# Patient Record
Sex: MALE | Race: WHITE
[De-identification: names, ages, dates, MRNs, and addresses within clinical notes are randomized per-mention and may not be internally consistent; named-entity substitution may affect disease eponyms.]

---

## 2019-03-09 ENCOUNTER — HOSPITAL ENCOUNTER (INPATIENT)
Dept: HOSPITAL 12 - ER | Age: 74
LOS: 3 days | Discharge: HOME | DRG: 871 | End: 2019-03-12
Payer: MEDICARE

## 2019-03-09 VITALS — WEIGHT: 289 LBS | HEIGHT: 68 IN | BODY MASS INDEX: 43.8 KG/M2

## 2019-03-09 DIAGNOSIS — L03.115: ICD-10-CM

## 2019-03-09 DIAGNOSIS — E11.51: ICD-10-CM

## 2019-03-09 DIAGNOSIS — Z91.81: ICD-10-CM

## 2019-03-09 DIAGNOSIS — N40.1: ICD-10-CM

## 2019-03-09 DIAGNOSIS — E66.01: ICD-10-CM

## 2019-03-09 DIAGNOSIS — R33.8: ICD-10-CM

## 2019-03-09 DIAGNOSIS — E78.5: ICD-10-CM

## 2019-03-09 DIAGNOSIS — B96.89: ICD-10-CM

## 2019-03-09 DIAGNOSIS — R65.20: ICD-10-CM

## 2019-03-09 DIAGNOSIS — I13.0: ICD-10-CM

## 2019-03-09 DIAGNOSIS — I25.10: ICD-10-CM

## 2019-03-09 DIAGNOSIS — E11.42: ICD-10-CM

## 2019-03-09 DIAGNOSIS — E87.1: ICD-10-CM

## 2019-03-09 DIAGNOSIS — E11.22: ICD-10-CM

## 2019-03-09 DIAGNOSIS — D63.8: ICD-10-CM

## 2019-03-09 DIAGNOSIS — Z71.3: ICD-10-CM

## 2019-03-09 DIAGNOSIS — A41.59: Primary | ICD-10-CM

## 2019-03-09 DIAGNOSIS — N18.9: ICD-10-CM

## 2019-03-09 DIAGNOSIS — I50.9: ICD-10-CM

## 2019-03-09 DIAGNOSIS — M89.9: ICD-10-CM

## 2019-03-09 DIAGNOSIS — Z16.11: ICD-10-CM

## 2019-03-09 DIAGNOSIS — E44.0: ICD-10-CM

## 2019-03-09 DIAGNOSIS — Z79.4: ICD-10-CM

## 2019-03-09 DIAGNOSIS — L03.116: ICD-10-CM

## 2019-03-09 DIAGNOSIS — I87.8: ICD-10-CM

## 2019-03-09 DIAGNOSIS — I45.10: ICD-10-CM

## 2019-03-09 DIAGNOSIS — N39.0: ICD-10-CM

## 2019-03-09 DIAGNOSIS — E11.65: ICD-10-CM

## 2019-03-09 DIAGNOSIS — N17.0: ICD-10-CM

## 2019-03-09 PROCEDURE — A4663 DIALYSIS BLOOD PRESSURE CUFF: HCPCS

## 2019-03-09 PROCEDURE — G0378 HOSPITAL OBSERVATION PER HR: HCPCS

## 2019-03-10 VITALS — SYSTOLIC BLOOD PRESSURE: 128 MMHG | DIASTOLIC BLOOD PRESSURE: 63 MMHG

## 2019-03-10 VITALS — SYSTOLIC BLOOD PRESSURE: 122 MMHG | DIASTOLIC BLOOD PRESSURE: 61 MMHG

## 2019-03-10 VITALS — DIASTOLIC BLOOD PRESSURE: 54 MMHG | SYSTOLIC BLOOD PRESSURE: 135 MMHG

## 2019-03-10 VITALS — DIASTOLIC BLOOD PRESSURE: 68 MMHG | SYSTOLIC BLOOD PRESSURE: 145 MMHG

## 2019-03-10 LAB
ALP SERPL-CCNC: 43 U/L (ref 50–136)
ALT SERPL W/O P-5'-P-CCNC: 15 U/L (ref 16–63)
APPEARANCE UR: (no result)
APPEARANCE UR: (no result)
AST SERPL-CCNC: 13 U/L (ref 15–37)
BASOPHILS # BLD AUTO: 0.1 K/UL (ref 0–8)
BASOPHILS # BLD AUTO: 0.2 K/UL (ref 0–8)
BASOPHILS NFR BLD AUTO: 0.5 % (ref 0–2)
BASOPHILS NFR BLD AUTO: 0.9 % (ref 0–2)
BILIRUB DIRECT SERPL-MCNC: 0.3 MG/DL (ref 0–0.2)
BILIRUB SERPL-MCNC: 1 MG/DL (ref 0.2–1)
BILIRUB UR QL STRIP: NEGATIVE
BILIRUB UR QL STRIP: NEGATIVE
BUN SERPL-MCNC: 32 MG/DL (ref 7–18)
BUN SERPL-MCNC: 32 MG/DL (ref 7–18)
CHLORIDE SERPL-SCNC: 100 MMOL/L (ref 98–107)
CHLORIDE SERPL-SCNC: 98 MMOL/L (ref 98–107)
CHOLEST SERPL-MCNC: 87 MG/DL (ref ?–200)
CO2 SERPL-SCNC: 25 MMOL/L (ref 21–32)
CO2 SERPL-SCNC: 26 MMOL/L (ref 21–32)
COLOR UR: (no result)
COLOR UR: YELLOW
CREAT SERPL-MCNC: 2.1 MG/DL (ref 0.6–1.3)
CREAT SERPL-MCNC: 2.3 MG/DL (ref 0.6–1.3)
CREAT UR-MCNC: 82.7 MG/DL (ref 30–125)
DEPRECATED SQUAMOUS URNS QL MICRO: (no result) /HPF
DEPRECATED SQUAMOUS URNS QL MICRO: (no result) /HPF
EOSINOPHIL # BLD AUTO: 0 K/UL (ref 0–0.7)
EOSINOPHIL # BLD AUTO: 0 K/UL (ref 0–0.7)
EOSINOPHIL NFR BLD AUTO: 0.2 % (ref 0–7)
EOSINOPHIL NFR BLD AUTO: 0.3 % (ref 0–7)
GLUCOSE SERPL-MCNC: 111 MG/DL (ref 74–106)
GLUCOSE SERPL-MCNC: 118 MG/DL (ref 74–106)
GLUCOSE UR STRIP-MCNC: (no result) MG/DL
GLUCOSE UR STRIP-MCNC: NEGATIVE MG/DL
HCT VFR BLD AUTO: 35 % (ref 36.7–47.1)
HCT VFR BLD AUTO: 36.4 % (ref 36.7–47.1)
HDLC SERPL-MCNC: 32 MG/DL (ref 40–60)
HGB BLD-MCNC: 11.9 G/DL (ref 12.5–16.3)
HGB BLD-MCNC: 12.1 G/DL (ref 12.5–16.3)
HGB UR QL STRIP: (no result)
HGB UR QL STRIP: (no result)
KETONES UR STRIP-MCNC: NEGATIVE MG/DL
KETONES UR STRIP-MCNC: NEGATIVE MG/DL
LEUKOCYTE ESTERASE UR QL STRIP: (no result)
LEUKOCYTE ESTERASE UR QL STRIP: (no result)
LYMPHOCYTES # BLD AUTO: 1.3 K/UL (ref 20–40)
LYMPHOCYTES # BLD AUTO: 1.5 K/UL (ref 20–40)
LYMPHOCYTES NFR BLD AUTO: 6.9 % (ref 20.5–51.5)
LYMPHOCYTES NFR BLD AUTO: 8.7 % (ref 20.5–51.5)
LYMPHOCYTES NFR BLD MANUAL: 3 % (ref 20–40)
MAGNESIUM SERPL-MCNC: 2.2 MG/DL (ref 1.8–2.4)
MCH RBC QN AUTO: 27.9 UUG (ref 23.8–33.4)
MCH RBC QN AUTO: 28.5 UUG (ref 23.8–33.4)
MCHC RBC AUTO-ENTMCNC: 33 G/DL (ref 32.5–36.3)
MCHC RBC AUTO-ENTMCNC: 34 G/DL (ref 32.5–36.3)
MCV RBC AUTO: 83.7 FL (ref 73–96.2)
MCV RBC AUTO: 84 FL (ref 73–96.2)
MONOCYTES # BLD AUTO: 1.8 K/UL (ref 2–10)
MONOCYTES # BLD AUTO: 2 K/UL (ref 2–10)
MONOCYTES NFR BLD AUTO: 11.7 % (ref 0–11)
MONOCYTES NFR BLD AUTO: 9.8 % (ref 0–11)
MONOCYTES NFR BLD MANUAL: 9 % (ref 2–10)
MUCOUS THREADS URNS QL MICRO: (no result) /LPF
NEUTROPHILS # BLD AUTO: 13.5 K/UL (ref 1.8–8.9)
NEUTROPHILS # BLD AUTO: 15.5 K/UL (ref 1.8–8.9)
NEUTROPHILS NFR BLD AUTO: 78.8 % (ref 38.5–71.5)
NEUTROPHILS NFR BLD AUTO: 82.2 % (ref 38.5–71.5)
NEUTS SEG NFR BLD MANUAL: 85 % (ref 42–75)
NITRITE UR QL STRIP: NEGATIVE
NITRITE UR QL STRIP: NEGATIVE
PH UR STRIP: 5.5 [PH] (ref 5–8)
PH UR STRIP: 6 [PH] (ref 5–8)
PHOSPHATE SERPL-MCNC: 3.1 MG/DL (ref 2.5–4.9)
PLATELET # BLD AUTO: 207 K/UL (ref 152–348)
PLATELET # BLD AUTO: 217 K/UL (ref 152–348)
POTASSIUM SERPL-SCNC: 4 MMOL/L (ref 3.5–5.1)
POTASSIUM SERPL-SCNC: 4.5 MMOL/L (ref 3.5–5.1)
PROT UR-MCNC: 97.9 MG/DL
RBC # BLD AUTO: 4.16 MIL/UL (ref 4.06–5.63)
RBC # BLD AUTO: 4.35 MIL/UL (ref 4.06–5.63)
RBC #/AREA URNS HPF: (no result) /HPF (ref 0–3)
RBC #/AREA URNS HPF: (no result) /HPF (ref 0–3)
SP GR UR STRIP: 1.02 (ref 1–1.03)
SP GR UR STRIP: 1.02 (ref 1–1.03)
TRIGL SERPL-MCNC: 62 MG/DL (ref 30–150)
UROBILINOGEN UR STRIP-MCNC: 0.2 E.U./DL
UROBILINOGEN UR STRIP-MCNC: 0.2 E.U./DL
WBC # BLD AUTO: 17.1 K/UL (ref 3.6–10.2)
WBC # BLD AUTO: 18.9 K/UL (ref 3.6–10.2)
WBC #/AREA URNS HPF: (no result) /HPF
WBC #/AREA URNS HPF: (no result) /HPF (ref 0–3)
WBC #/AREA URNS HPF: (no result) /HPF (ref 0–3)
WS STN SPEC: 7.1 G/DL (ref 6.4–8.2)

## 2019-03-10 RX ADMIN — INSULIN GLARGINE SCH UNITS: 100 INJECTION, SOLUTION SUBCUTANEOUS at 21:10

## 2019-03-10 RX ADMIN — Medication SCH EACH: at 09:00

## 2019-03-10 RX ADMIN — CLONIDINE HYDROCHLORIDE SCH MG: 0.2 TABLET ORAL at 21:50

## 2019-03-10 RX ADMIN — Medication SCH MG: at 21:06

## 2019-03-10 RX ADMIN — DOXYCYCLINE HYCLATE SCH MG: 100 TABLET, COATED ORAL at 21:07

## 2019-03-10 RX ADMIN — GABAPENTIN SCH MG: 300 CAPSULE ORAL at 21:06

## 2019-03-10 RX ADMIN — Medication SCH EACH: at 11:00

## 2019-03-10 RX ADMIN — Medication SCH EACH: at 21:07

## 2019-03-10 RX ADMIN — Medication SCH EACH: at 16:09

## 2019-03-10 RX ADMIN — SODIUM CHLORIDE PRN MLS/HR: 0.9 INJECTION, SOLUTION INTRAVENOUS at 19:50

## 2019-03-10 RX ADMIN — CARVEDILOL SCH MG: 6.25 TABLET, FILM COATED ORAL at 17:08

## 2019-03-10 RX ADMIN — INSULIN HUMAN PRN UNITS: 100 INJECTION, SOLUTION PARENTERAL at 21:09

## 2019-03-10 RX ADMIN — SODIUM CHLORIDE PRN UNIT: 9 INJECTION, SOLUTION INTRAVENOUS at 11:09

## 2019-03-10 RX ADMIN — SODIUM CHLORIDE PRN MLS/HR: 0.9 INJECTION, SOLUTION INTRAVENOUS at 05:10

## 2019-03-10 RX ADMIN — SODIUM CHLORIDE PRN UNIT: 9 INJECTION, SOLUTION INTRAVENOUS at 16:10

## 2019-03-10 NOTE — NUR
Patient BIB  via [employer]. AAOx4. Speech is clear, speaks in complete 
sentences. No neuro deficits noted. Patient came in with c/o episodal general 
weakness at [id number]. No respiratory distress noted. No cardiovascular 
distress noted, all pulses palpable. No GI/.



Patient in bed at lowest position, side rails upx2, call light within reach. 
Fall precautions implemented per protocol.

## 2019-03-10 NOTE — NUR
Bladder scan done with no residual noted. Safety maintained. Call light within reach. Will 
continue to monitor.

## 2019-03-10 NOTE — NUR
Called [facility] for panel call, Dr. Chavez on the phone w/ Dr. Mack, called for 
med/surg bed - pt. to go into room 318

## 2019-03-10 NOTE — NUR
PATIENT RESTING COMFORTABLY IN BED. NO C/O PAIN AT THIS TIME. UP TO BATHROOM WITH 
ASSISTANCE. WILL ENDORSE TO ONCOMING SHIFT ACCORDINGLY.

## 2019-03-10 NOTE — NUR
SBAR RECEIVED FROM ER NURSE. PATIENT ALERT AND ORIENTED X4. FARSI SPEAKING PRIMARILY, BUT 
SPEAKS ENGLISH WELL. NO C/O PAIN OR SOB AT THIS TIME. WIFE AT BEDSIDE. SIDERAILS UPP 
BILATERALLY. WILL CONTINUE TO MONITOR.

## 2019-03-11 VITALS — SYSTOLIC BLOOD PRESSURE: 139 MMHG | DIASTOLIC BLOOD PRESSURE: 59 MMHG

## 2019-03-11 VITALS — SYSTOLIC BLOOD PRESSURE: 142 MMHG | DIASTOLIC BLOOD PRESSURE: 64 MMHG

## 2019-03-11 VITALS — DIASTOLIC BLOOD PRESSURE: 56 MMHG | SYSTOLIC BLOOD PRESSURE: 119 MMHG

## 2019-03-11 VITALS — SYSTOLIC BLOOD PRESSURE: 134 MMHG | DIASTOLIC BLOOD PRESSURE: 63 MMHG

## 2019-03-11 LAB
ALP SERPL-CCNC: 49 U/L (ref 50–136)
ALT SERPL W/O P-5'-P-CCNC: 15 U/L (ref 16–63)
AST SERPL-CCNC: 21 U/L (ref 15–37)
BASOPHILS # BLD AUTO: 0.1 K/UL (ref 0–8)
BASOPHILS NFR BLD AUTO: 0.4 % (ref 0–2)
BILIRUB SERPL-MCNC: 0.6 MG/DL (ref 0.2–1)
BUN SERPL-MCNC: 27 MG/DL (ref 7–18)
CHLORIDE SERPL-SCNC: 101 MMOL/L (ref 98–107)
CO2 SERPL-SCNC: 26 MMOL/L (ref 21–32)
CREAT SERPL-MCNC: 1.9 MG/DL (ref 0.6–1.3)
EOSINOPHIL # BLD AUTO: 0.3 K/UL (ref 0–0.7)
EOSINOPHIL NFR BLD AUTO: 1.8 % (ref 0–7)
GLUCOSE SERPL-MCNC: 158 MG/DL (ref 74–106)
HCT VFR BLD AUTO: 36.4 % (ref 36.7–47.1)
HGB BLD-MCNC: 12.1 G/DL (ref 12.5–16.3)
LYMPHOCYTES # BLD AUTO: 1.7 K/UL (ref 20–40)
LYMPHOCYTES NFR BLD AUTO: 12.3 % (ref 20.5–51.5)
MAGNESIUM SERPL-MCNC: 2.3 MG/DL (ref 1.8–2.4)
MCH RBC QN AUTO: 28.3 UUG (ref 23.8–33.4)
MCHC RBC AUTO-ENTMCNC: 33 G/DL (ref 32.5–36.3)
MCV RBC AUTO: 84.9 FL (ref 73–96.2)
MONOCYTES # BLD AUTO: 1.3 K/UL (ref 2–10)
MONOCYTES NFR BLD AUTO: 9.7 % (ref 0–11)
NEUTROPHILS # BLD AUTO: 10.5 K/UL (ref 1.8–8.9)
NEUTROPHILS NFR BLD AUTO: 75.8 % (ref 38.5–71.5)
PHOSPHATE SERPL-MCNC: 3.6 MG/DL (ref 2.5–4.9)
PLATELET # BLD AUTO: 204 K/UL (ref 152–348)
POTASSIUM SERPL-SCNC: 4.4 MMOL/L (ref 3.5–5.1)
RBC # BLD AUTO: 4.29 MIL/UL (ref 4.06–5.63)
WBC # BLD AUTO: 13.9 K/UL (ref 3.6–10.2)
WS STN SPEC: 6.9 G/DL (ref 6.4–8.2)

## 2019-03-11 RX ADMIN — DOXYCYCLINE HYCLATE SCH MG: 100 TABLET, COATED ORAL at 21:41

## 2019-03-11 RX ADMIN — SODIUM CHLORIDE PRN UNIT: 9 INJECTION, SOLUTION INTRAVENOUS at 07:25

## 2019-03-11 RX ADMIN — CLOPIDOGREL BISULFATE SCH MG: 75 TABLET ORAL at 08:00

## 2019-03-11 RX ADMIN — DOXYCYCLINE HYCLATE SCH MG: 100 TABLET, COATED ORAL at 08:00

## 2019-03-11 RX ADMIN — GABAPENTIN SCH MG: 300 CAPSULE ORAL at 20:49

## 2019-03-11 RX ADMIN — DEXTROSE SCH MLS/HR: 50 INJECTION, SOLUTION INTRAVENOUS at 21:04

## 2019-03-11 RX ADMIN — CLOTRIMAZOLE AND BETAMETHASONE DIPROPIONATE SCH GM: 10; .5 CREAM TOPICAL at 08:01

## 2019-03-11 RX ADMIN — SODIUM CHLORIDE PRN MLS/HR: 0.9 INJECTION, SOLUTION INTRAVENOUS at 08:40

## 2019-03-11 RX ADMIN — SODIUM CHLORIDE PRN MLS/HR: 0.9 INJECTION, SOLUTION INTRAVENOUS at 21:38

## 2019-03-11 RX ADMIN — Medication SCH EACH: at 06:45

## 2019-03-11 RX ADMIN — Medication SCH EACH: at 15:47

## 2019-03-11 RX ADMIN — CARVEDILOL SCH MG: 6.25 TABLET, FILM COATED ORAL at 17:13

## 2019-03-11 RX ADMIN — SODIUM CHLORIDE PRN UNIT: 9 INJECTION, SOLUTION INTRAVENOUS at 16:26

## 2019-03-11 RX ADMIN — LOSARTAN POTASSIUM SCH MG: 50 TABLET, FILM COATED ORAL at 08:00

## 2019-03-11 RX ADMIN — INSULIN GLARGINE SCH UNITS: 100 INJECTION, SOLUTION SUBCUTANEOUS at 20:57

## 2019-03-11 RX ADMIN — Medication SCH EACH: at 10:40

## 2019-03-11 RX ADMIN — CLONIDINE HYDROCHLORIDE SCH MG: 0.2 TABLET ORAL at 21:00

## 2019-03-11 RX ADMIN — Medication SCH MG: at 20:49

## 2019-03-11 RX ADMIN — CLONIDINE HYDROCHLORIDE SCH MG: 0.2 TABLET ORAL at 08:00

## 2019-03-11 RX ADMIN — CARVEDILOL SCH MG: 6.25 TABLET, FILM COATED ORAL at 08:00

## 2019-03-11 RX ADMIN — FUROSEMIDE SCH MG: 40 TABLET ORAL at 08:00

## 2019-03-11 RX ADMIN — INSULIN HUMAN PRN UNITS: 100 INJECTION, SOLUTION PARENTERAL at 21:00

## 2019-03-11 RX ADMIN — Medication SCH EACH: at 20:50

## 2019-03-11 RX ADMIN — DEXTROSE SCH MLS/HR: 50 INJECTION, SOLUTION INTRAVENOUS at 01:07

## 2019-03-11 RX ADMIN — SODIUM CHLORIDE PRN UNIT: 9 INJECTION, SOLUTION INTRAVENOUS at 11:09

## 2019-03-11 NOTE — NUR
Pt slept comfortably throughout shift. No acute distress noted. No complaints of pain. Kept 
clean and dry, good pericare rendered. Continuing IVF NS @ 75 cc/hr on LFA. No s/s 
infiltration noted. All due medications given as ordered and tolerated well. Safety 
maintained. Call light and all personal belongings within reach. Will endorse accordingly to 
oncoming shift.

## 2019-03-11 NOTE — NUR
PATIENT AWAKE NO COMPLAIN OF PAIN, NO SOB NO CHEST PAIN. PATIENT VOIDING FREELY IN [facility] [place] 
AMOUNT. PATIENT HAS NO COMPLAIN ABDOMINAL PAIN AT THIS TIME. CALL LIGHT WITHIN REACH.

## 2019-03-12 VITALS — SYSTOLIC BLOOD PRESSURE: 145 MMHG | DIASTOLIC BLOOD PRESSURE: 61 MMHG

## 2019-03-12 VITALS — DIASTOLIC BLOOD PRESSURE: 64 MMHG | SYSTOLIC BLOOD PRESSURE: 142 MMHG

## 2019-03-12 VITALS — DIASTOLIC BLOOD PRESSURE: 69 MMHG | SYSTOLIC BLOOD PRESSURE: 133 MMHG

## 2019-03-12 VITALS — DIASTOLIC BLOOD PRESSURE: 61 MMHG | SYSTOLIC BLOOD PRESSURE: 61 MMHG

## 2019-03-12 LAB
BASOPHILS # BLD AUTO: 0.1 K/UL (ref 0–8)
BASOPHILS NFR BLD AUTO: 0.8 % (ref 0–2)
BUN SERPL-MCNC: 28 MG/DL (ref 7–18)
CHLORIDE SERPL-SCNC: 101 MMOL/L (ref 98–107)
CO2 SERPL-SCNC: 23 MMOL/L (ref 21–32)
CREAT SERPL-MCNC: 1.9 MG/DL (ref 0.6–1.3)
EOSINOPHIL # BLD AUTO: 0.5 K/UL (ref 0–0.7)
EOSINOPHIL NFR BLD AUTO: 3.5 % (ref 0–7)
GLUCOSE SERPL-MCNC: 182 MG/DL (ref 74–106)
HCT VFR BLD AUTO: 35.5 % (ref 36.7–47.1)
HGB BLD-MCNC: 12 G/DL (ref 12.5–16.3)
LYMPHOCYTES # BLD AUTO: 1.3 K/UL (ref 20–40)
LYMPHOCYTES NFR BLD AUTO: 10.3 % (ref 20.5–51.5)
MCH RBC QN AUTO: 28.5 UUG (ref 23.8–33.4)
MCHC RBC AUTO-ENTMCNC: 34 G/DL (ref 32.5–36.3)
MCV RBC AUTO: 84 FL (ref 73–96.2)
MONOCYTES # BLD AUTO: 1.4 K/UL (ref 2–10)
MONOCYTES NFR BLD AUTO: 11.2 % (ref 0–11)
NEUTROPHILS # BLD AUTO: 9.6 K/UL (ref 1.8–8.9)
NEUTROPHILS NFR BLD AUTO: 74.2 % (ref 38.5–71.5)
PLATELET # BLD AUTO: 210 K/UL (ref 152–348)
POTASSIUM SERPL-SCNC: 4.3 MMOL/L (ref 3.5–5.1)
RBC # BLD AUTO: 4.23 MIL/UL (ref 4.06–5.63)
WBC # BLD AUTO: 12.9 K/UL (ref 3.6–10.2)

## 2019-03-12 RX ADMIN — CARVEDILOL SCH MG: 6.25 TABLET, FILM COATED ORAL at 17:23

## 2019-03-12 RX ADMIN — SODIUM CHLORIDE PRN UNIT: 9 INJECTION, SOLUTION INTRAVENOUS at 11:50

## 2019-03-12 RX ADMIN — SODIUM CHLORIDE PRN UNIT: 9 INJECTION, SOLUTION INTRAVENOUS at 17:22

## 2019-03-12 RX ADMIN — Medication SCH EACH: at 11:45

## 2019-03-12 RX ADMIN — Medication SCH EACH: at 17:17

## 2019-03-12 RX ADMIN — DOXYCYCLINE HYCLATE SCH MG: 100 TABLET, COATED ORAL at 08:25

## 2019-03-12 RX ADMIN — CLOTRIMAZOLE AND BETAMETHASONE DIPROPIONATE SCH GM: 10; .5 CREAM TOPICAL at 08:26

## 2019-03-12 RX ADMIN — CLONIDINE HYDROCHLORIDE SCH MG: 0.2 TABLET ORAL at 08:26

## 2019-03-12 RX ADMIN — LOSARTAN POTASSIUM SCH MG: 50 TABLET, FILM COATED ORAL at 08:26

## 2019-03-12 RX ADMIN — CLOPIDOGREL BISULFATE SCH MG: 75 TABLET ORAL at 08:25

## 2019-03-12 RX ADMIN — Medication SCH EACH: at 06:06

## 2019-03-12 RX ADMIN — FUROSEMIDE SCH MG: 40 TABLET ORAL at 08:25

## 2019-03-12 RX ADMIN — CARVEDILOL SCH MG: 6.25 TABLET, FILM COATED ORAL at 08:25

## 2019-03-12 RX ADMIN — SODIUM CHLORIDE PRN UNIT: 9 INJECTION, SOLUTION INTRAVENOUS at 08:22

## 2019-03-12 NOTE — NUR
PATIENT SLEPT MOST OF THE NIGHT NO SOB NO CHEST PAIN. PATIENT VOIDING FREELY, NO COMPLAIN OF 
PAIN. CONT TO MONITOR.

## 2019-03-12 NOTE — NUR
PATIENT REFUSED TO HAVE A NEW BAG OF IVF RESTARTED STATED THAT HE WAS TOLD THAT HE IS GOING 
HOME TODAY AND THAT HE DRINKS LOTS OF WATER SO DOES NOT NEED IVF AT THIS TIME.NP [place] STATED PATIENT WILL BE DISCHARGED TODAY.

## 2019-03-12 NOTE — NUR
PATIENT DISCHARGED WHEELED DOWN BY W/CHAIR TO THE FRONT LOBBY AND PICKED UP BY HIS WIFE MYRANDA 
WITH ALL OF HIS PERSONAL BELONGINGS.

## 2019-03-12 NOTE — NUR
DISCHARGE ORDER NOTED AND PATIENT STATED THAT HIS WIFE MYRANDA WILL BE HERE ABOUT 1800 TO PICK 
HIM UP.DISCHARGE INSTRUCTIONS AND PRESCRIPTION GIVEN TO PATIENT AND HE WAS INSTRUCTED TO 
CONTINUE MEDICATIONS AS ORDERED AND TO CALL HIS PRIMARY DOCTOR FOR A FOLLOW UP APPOINTMENT 
WITHIN THE NEXT ONE WEEK AND HE EXPRESSED UNDERSTANDING.

## 2019-03-12 NOTE — NUR
RECEIVED PATIENT AWAKE ALERT AND ORIENTED TOLERATED DIET AS ORDERED INSULIN PER SLIDING 
SCALE GIVEN WITH NO S/S OF HYPO/HYPERGLYCEMIC REACTIONS AT THIS TIME MADE COMFORTABLE WITH 
ALL OF HIS PERSONAL BELONGINGS AND CALL LIGHTES PLACED WITHIN EASY REACH.WILL CONTINUE TO 
OBSERVE.

## 2019-03-12 NOTE — NUR
C/O HAS BODY ACHES AND HIS TEMP IS 99.8 ORAL MEDICATED WITH TYLENOL [facility] HIM TODAY AWAITING FOR ORDERS.

## 2019-03-12 NOTE — NUR
PER THE  DISCHARGE PLANNER PATIENT WILL BE DISCHARGED TODAY AWAITING FOR THE 
DOCTORS ORDERS PATIENT REFUSED TO HAVE HIS IVF REPLACED AT THIS TIME.

## 2019-03-12 NOTE — NUR
PASCHUAL NP AWARE OF LOW GRADE FEVER STATED THAT PATIENT WILL BE DISCHARGED TODAY ON 
ANTIBIOTICS STATED THAT THE ID HAS CLEARED PATIENT FOR DISCHARGE.

## 2019-06-02 ENCOUNTER — HOSPITAL ENCOUNTER (INPATIENT)
Dept: HOSPITAL 12 - ER | Age: 74
LOS: 4 days | Discharge: HOME HEALTH | DRG: 602 | End: 2019-06-06
Attending: INTERNAL MEDICINE
Payer: MEDICARE

## 2019-06-02 VITALS — DIASTOLIC BLOOD PRESSURE: 52 MMHG | SYSTOLIC BLOOD PRESSURE: 125 MMHG

## 2019-06-02 VITALS — BODY MASS INDEX: 47.41 KG/M2 | WEIGHT: 295 LBS | HEIGHT: 66 IN

## 2019-06-02 DIAGNOSIS — D63.8: ICD-10-CM

## 2019-06-02 DIAGNOSIS — I21.A1: ICD-10-CM

## 2019-06-02 DIAGNOSIS — Z79.02: ICD-10-CM

## 2019-06-02 DIAGNOSIS — I87.8: ICD-10-CM

## 2019-06-02 DIAGNOSIS — Z87.440: ICD-10-CM

## 2019-06-02 DIAGNOSIS — L97.819: ICD-10-CM

## 2019-06-02 DIAGNOSIS — E78.5: ICD-10-CM

## 2019-06-02 DIAGNOSIS — Z86.73: ICD-10-CM

## 2019-06-02 DIAGNOSIS — L97.919: ICD-10-CM

## 2019-06-02 DIAGNOSIS — I87.2: ICD-10-CM

## 2019-06-02 DIAGNOSIS — N18.4: ICD-10-CM

## 2019-06-02 DIAGNOSIS — N40.1: ICD-10-CM

## 2019-06-02 DIAGNOSIS — L03.115: Primary | ICD-10-CM

## 2019-06-02 DIAGNOSIS — E87.1: ICD-10-CM

## 2019-06-02 DIAGNOSIS — E11.22: ICD-10-CM

## 2019-06-02 DIAGNOSIS — I50.33: ICD-10-CM

## 2019-06-02 DIAGNOSIS — N17.9: ICD-10-CM

## 2019-06-02 DIAGNOSIS — I13.0: ICD-10-CM

## 2019-06-02 DIAGNOSIS — E66.01: ICD-10-CM

## 2019-06-02 DIAGNOSIS — Z79.4: ICD-10-CM

## 2019-06-02 DIAGNOSIS — Z79.899: ICD-10-CM

## 2019-06-02 DIAGNOSIS — L03.116: ICD-10-CM

## 2019-06-02 LAB
ALP SERPL-CCNC: 39 U/L (ref 50–136)
ALT SERPL W/O P-5'-P-CCNC: 44 U/L (ref 16–63)
AMORPH URATE CRY URNS QL MICRO: (no result) /HPF
APPEARANCE UR: CLEAR
AST SERPL-CCNC: 70 U/L (ref 15–37)
BASOPHILS # BLD AUTO: 0.1 K/UL (ref 0–8)
BASOPHILS NFR BLD AUTO: 0.9 % (ref 0–2)
BILIRUB DIRECT SERPL-MCNC: 0.2 MG/DL (ref 0–0.2)
BILIRUB SERPL-MCNC: 0.7 MG/DL (ref 0.2–1)
BILIRUB UR QL STRIP: NEGATIVE
BUN SERPL-MCNC: 48 MG/DL (ref 7–18)
CHLORIDE SERPL-SCNC: 99 MMOL/L (ref 98–107)
CO2 SERPL-SCNC: 26 MMOL/L (ref 21–32)
COARSE GRAN CASTS URNS QL MICRO: (no result) /LPF
COLOR UR: YELLOW
CREAT SERPL-MCNC: 2.5 MG/DL (ref 0.6–1.3)
DEPRECATED SQUAMOUS URNS QL MICRO: (no result) /HPF
EOSINOPHIL # BLD AUTO: 0 K/UL (ref 0–0.7)
EOSINOPHIL NFR BLD AUTO: 0.3 % (ref 0–7)
GLUCOSE SERPL-MCNC: 97 MG/DL (ref 74–106)
GLUCOSE UR STRIP-MCNC: NEGATIVE MG/DL
HCT VFR BLD AUTO: 33.1 % (ref 36.7–47.1)
HGB BLD-MCNC: 11.3 G/DL (ref 12.5–16.3)
HGB UR QL STRIP: (no result)
KETONES UR STRIP-MCNC: NEGATIVE MG/DL
LEUKOCYTE ESTERASE UR QL STRIP: NEGATIVE
LIPASE SERPL-CCNC: 209 U/L (ref 73–393)
LYMPHOCYTES # BLD AUTO: 1.1 K/UL (ref 20–40)
LYMPHOCYTES NFR BLD AUTO: 13.8 % (ref 20.5–51.5)
LYMPHOCYTES NFR BLD MANUAL: 16 % (ref 20–40)
MCH RBC QN AUTO: 28.5 UUG (ref 23.8–33.4)
MCHC RBC AUTO-ENTMCNC: 34 G/DL (ref 32.5–36.3)
MCV RBC AUTO: 83.5 FL (ref 73–96.2)
MONOCYTES # BLD AUTO: 1.5 K/UL (ref 2–10)
MONOCYTES NFR BLD AUTO: 19.3 % (ref 0–11)
MONOCYTES NFR BLD MANUAL: 20 % (ref 2–10)
MUCOUS THREADS URNS QL MICRO: (no result) /LPF
NEUTROPHILS # BLD AUTO: 5.1 K/UL (ref 1.8–8.9)
NEUTROPHILS NFR BLD AUTO: 65.7 % (ref 38.5–71.5)
NEUTS BAND NFR BLD MANUAL: 6 % (ref 0–10)
NEUTS SEG NFR BLD MANUAL: 58 % (ref 42–75)
NITRITE UR QL STRIP: NEGATIVE
PH UR STRIP: 5 [PH] (ref 5–8)
PLATELET # BLD AUTO: 186 K/UL (ref 152–348)
POTASSIUM SERPL-SCNC: 4.6 MMOL/L (ref 3.5–5.1)
RBC # BLD AUTO: 3.96 MIL/UL (ref 4.06–5.63)
RBC #/AREA URNS HPF: (no result) /HPF (ref 0–3)
SP GR UR STRIP: 1.01 (ref 1–1.03)
UROBILINOGEN UR STRIP-MCNC: 0.2 E.U./DL
WBC # BLD AUTO: 7.7 K/UL (ref 3.6–10.2)
WBC #/AREA URNS HPF: (no result) /HPF (ref 0–3)
WS STN SPEC: 7 G/DL (ref 6.4–8.2)

## 2019-06-02 PROCEDURE — A4663 DIALYSIS BLOOD PRESSURE CUFF: HCPCS

## 2019-06-02 PROCEDURE — G0378 HOSPITAL OBSERVATION PER HR: HCPCS

## 2019-06-02 PROCEDURE — A9150 MISC/EXPER NON-PRESCRIPT DRU: HCPCS

## 2019-06-02 NOTE — NUR
Pt comes to ER via wheelchair accompanied by wife with c/o fever x 3 days. Pt 
saw his PCP & was told to be admitted to hospital for his diabetic ulcer under 
right leg. Pt is awake, alert, oriented x 4. Needs assistance ambulating. Pt 
placed on cardiac monitor. Pending MD orders.

## 2019-06-02 NOTE — NUR
Pt. admitted to Telemetry, under care of Goldie Lundberg NP.

Diagnosis:  Right Leg Cellulitis.

Belongs List completed

## 2019-06-02 NOTE — NUR
ADMITTED PATIENT IN TELE FLOOR UNDER THE CARE OF MIKEL KAMARA NP, BELONG LIST DONE. 
PATIENT ALERT ORIENTED, NO SOB NO CHEST PAIN, SINUS RHYTHM AT THIS TIME. PATIENT HAS 
CELLULITIS OF R LOWER EXTREMITY, AND BILATERAL SWELLING OF BOTH LOWER EXTREMITY. CONT TO 
MONITOR.

## 2019-06-03 VITALS — SYSTOLIC BLOOD PRESSURE: 111 MMHG | DIASTOLIC BLOOD PRESSURE: 40 MMHG

## 2019-06-03 VITALS — DIASTOLIC BLOOD PRESSURE: 66 MMHG | SYSTOLIC BLOOD PRESSURE: 114 MMHG

## 2019-06-03 VITALS — SYSTOLIC BLOOD PRESSURE: 108 MMHG | DIASTOLIC BLOOD PRESSURE: 83 MMHG

## 2019-06-03 VITALS — DIASTOLIC BLOOD PRESSURE: 48 MMHG | SYSTOLIC BLOOD PRESSURE: 122 MMHG

## 2019-06-03 VITALS — SYSTOLIC BLOOD PRESSURE: 140 MMHG | DIASTOLIC BLOOD PRESSURE: 57 MMHG

## 2019-06-03 LAB
ALP SERPL-CCNC: 34 U/L (ref 50–136)
ALT SERPL W/O P-5'-P-CCNC: 41 U/L (ref 16–63)
AST SERPL-CCNC: 58 U/L (ref 15–37)
BASOPHILS # BLD AUTO: 0 K/UL (ref 0–8)
BASOPHILS NFR BLD AUTO: 0.3 % (ref 0–2)
BILIRUB SERPL-MCNC: 0.6 MG/DL (ref 0.1–1)
BUN SERPL-MCNC: 51 MG/DL (ref 7–18)
CHLORIDE SERPL-SCNC: 101 MMOL/L (ref 98–107)
CHOLEST SERPL-MCNC: 82 MG/DL (ref ?–200)
CO2 SERPL-SCNC: 22 MMOL/L (ref 21–32)
CREAT SERPL-MCNC: 2.7 MG/DL (ref 0.6–1.3)
CREAT UR-MCNC: 96.7 MG/DL (ref 30–125)
EOSINOPHIL # BLD AUTO: 0 K/UL (ref 0–0.7)
EOSINOPHIL NFR BLD AUTO: 0.3 % (ref 0–7)
GLUCOSE SERPL-MCNC: 304 MG/DL (ref 74–106)
HCT VFR BLD AUTO: 31.8 % (ref 36.7–47.1)
HDLC SERPL-MCNC: 17 MG/DL (ref 40–60)
HGB BLD-MCNC: 10.9 G/DL (ref 12.5–16.3)
LYMPHOCYTES # BLD AUTO: 0.3 K/UL (ref 20–40)
LYMPHOCYTES NFR BLD AUTO: 3.2 % (ref 20.5–51.5)
MAGNESIUM SERPL-MCNC: 2.2 MG/DL (ref 1.8–2.4)
MCH RBC QN AUTO: 28.9 UUG (ref 23.8–33.4)
MCHC RBC AUTO-ENTMCNC: 34 G/DL (ref 32.5–36.3)
MCV RBC AUTO: 84.3 FL (ref 73–96.2)
MONOCYTES # BLD AUTO: 0.5 K/UL (ref 2–10)
MONOCYTES NFR BLD AUTO: 5.9 % (ref 0–11)
NEUTROPHILS # BLD AUTO: 8.1 K/UL (ref 1.8–8.9)
NEUTROPHILS NFR BLD AUTO: 90.3 % (ref 38.5–71.5)
PHOSPHATE SERPL-MCNC: 3.4 MG/DL (ref 2.5–4.9)
PLATELET # BLD AUTO: 160 K/UL (ref 152–348)
POTASSIUM SERPL-SCNC: 4.5 MMOL/L (ref 3.5–5.1)
RBC # BLD AUTO: 3.78 MIL/UL (ref 4.06–5.63)
TRIGL SERPL-MCNC: 113 MG/DL (ref 30–150)
TSH SERPL DL<=0.005 MIU/L-ACNC: 1.76 MIU/ML (ref 0.36–3.74)
WBC # BLD AUTO: 8.9 K/UL (ref 3.6–10.2)
WS STN SPEC: 6.3 G/DL (ref 6.4–8.2)

## 2019-06-03 RX ADMIN — Medication SCH EACH: at 05:57

## 2019-06-03 RX ADMIN — INSULIN GLARGINE SCH UNITS: 100 INJECTION, SOLUTION SUBCUTANEOUS at 21:14

## 2019-06-03 RX ADMIN — AMLODIPINE BESYLATE SCH MG: 10 TABLET ORAL at 08:29

## 2019-06-03 RX ADMIN — ACETAMINOPHEN PRN MG: 325 TABLET ORAL at 01:24

## 2019-06-03 RX ADMIN — ACETAMINOPHEN PRN MG: 325 TABLET ORAL at 07:04

## 2019-06-03 RX ADMIN — FAMOTIDINE SCH MG: 10 INJECTION INTRAVENOUS at 11:54

## 2019-06-03 RX ADMIN — Medication SCH EACH: at 20:34

## 2019-06-03 RX ADMIN — CARVEDILOL SCH MG: 12.5 TABLET, FILM COATED ORAL at 17:33

## 2019-06-03 RX ADMIN — Medication SCH EACH: at 16:08

## 2019-06-03 RX ADMIN — SODIUM CHLORIDE PRN UNIT: 9 INJECTION, SOLUTION INTRAVENOUS at 08:00

## 2019-06-03 RX ADMIN — LINEZOLID SCH MG: 600 TABLET, FILM COATED ORAL at 21:12

## 2019-06-03 RX ADMIN — Medication SCH EACH: at 11:54

## 2019-06-03 RX ADMIN — ACETAMINOPHEN PRN MG: 325 TABLET ORAL at 18:59

## 2019-06-03 RX ADMIN — SODIUM CHLORIDE PRN UNIT: 9 INJECTION, SOLUTION INTRAVENOUS at 12:01

## 2019-06-03 RX ADMIN — CLONIDINE HYDROCHLORIDE SCH MG: 0.2 TABLET ORAL at 08:29

## 2019-06-03 RX ADMIN — ALPRAZOLAM PRN MG: 0.25 TABLET ORAL at 08:28

## 2019-06-03 RX ADMIN — Medication SCH MG: at 21:12

## 2019-06-03 RX ADMIN — LEVOFLOXACIN SCH MG: 250 TABLET, FILM COATED ORAL at 21:10

## 2019-06-03 RX ADMIN — CLOPIDOGREL BISULFATE SCH MG: 75 TABLET ORAL at 08:28

## 2019-06-03 RX ADMIN — ATORVASTATIN CALCIUM SCH MG: 10 TABLET, FILM COATED ORAL at 21:11

## 2019-06-03 RX ADMIN — INSULIN GLARGINE SCH UNITS: 100 INJECTION, SOLUTION SUBCUTANEOUS at 11:58

## 2019-06-03 RX ADMIN — OXYBUTYNIN CHLORIDE SCH MG: 5 TABLET, EXTENDED RELEASE ORAL at 21:12

## 2019-06-03 RX ADMIN — CLONIDINE HYDROCHLORIDE SCH MG: 0.2 TABLET ORAL at 21:00

## 2019-06-03 RX ADMIN — CARVEDILOL SCH MG: 12.5 TABLET, FILM COATED ORAL at 08:28

## 2019-06-03 RX ADMIN — FUROSEMIDE SCH MG: 40 TABLET ORAL at 08:28

## 2019-06-03 NOTE — NUR
CALL RECEIVED FROM LAB GLUCOSE  PATIENT ALREADY HAD HIS BREAKFAST AND REGULAR INSULIN 
PER SLIDING SCALE AS ORDERED NO S/S OF HYPERGLYCEMIC REACTIONS AT THIS TIME WILL CONTINUE TO 
OBSERVE PATIENT AND CHECK HIS BLOOD SUGAR BEFORE LUNCH AND WILL THEN INTERVENE AS NEEDED.

## 2019-06-03 NOTE — NUR
PATIENT HAS PERSISTENT ELEVATED TEMP  ORAL DESPITE TYLENOL AND COOLING MEASURES, 
NOTIFY CARLTON MASCORRO WITH NO FURTHER ORDER AT THIS TIME. CONTINUE COOLING MEASURES. 
PATIENT ALERT ORIENTED, NO SOB NO CHEST PAIN, ASSISTED WITH URINATION USES URINAL. NO S/S OF 
DISTRESS. CONT TO MONITOR.

## 2019-06-03 NOTE — NUR
PATIENT HAS DIFFICULTY BREADING, KEPT HOB ELEVATED, GIVEN OXYGEN 6LPM SAT 92 TO 95%, PATIENT 
DENIES HAVING COPD OR ASTHMA, PATIENT ALSO HAVE TEMP 102.1, GIVEN COOLING MEASURES AND 
TYLENOL. NOTIFY MYNOR MASCORRO REGARDING PATIENT CONDITION WITH ORDER OF ALBUTEROL HHN AND 
XANAX FOR ANXIETY. CONT TO MONITOR.

## 2019-06-03 NOTE — NUR
PATIENT OXYGEN LOWER DOWN TO 3LPM NC SAT 95 TO 97%, PATIENT BREATHING BETTER, CONT ON 
COOLING MEASURES FOR ELEVATED TEMP. NO S/S OF DISTRESS. CONT TO MONITOR.

## 2019-06-03 NOTE — NUR
DR RYDER HERE AND I NOTIFIED HIM RE TROP LEVEL IS 0.006 TODAY AND HE SEEN AND EXAMINED 
PATIENT WITH NO NEW ORDERS AT THIS TIME.

## 2019-06-04 VITALS — DIASTOLIC BLOOD PRESSURE: 42 MMHG | SYSTOLIC BLOOD PRESSURE: 125 MMHG

## 2019-06-04 VITALS — DIASTOLIC BLOOD PRESSURE: 64 MMHG | SYSTOLIC BLOOD PRESSURE: 142 MMHG

## 2019-06-04 VITALS — SYSTOLIC BLOOD PRESSURE: 134 MMHG | DIASTOLIC BLOOD PRESSURE: 59 MMHG

## 2019-06-04 VITALS — DIASTOLIC BLOOD PRESSURE: 46 MMHG | SYSTOLIC BLOOD PRESSURE: 122 MMHG

## 2019-06-04 VITALS — SYSTOLIC BLOOD PRESSURE: 109 MMHG | DIASTOLIC BLOOD PRESSURE: 52 MMHG

## 2019-06-04 LAB
ALP SERPL-CCNC: 37 U/L (ref 50–136)
ALT SERPL W/O P-5'-P-CCNC: 43 U/L (ref 16–63)
AST SERPL-CCNC: 43 U/L (ref 15–37)
BASOPHILS # BLD AUTO: 0 K/UL (ref 0–8)
BASOPHILS NFR BLD AUTO: 0.3 % (ref 0–2)
BILIRUB SERPL-MCNC: 0.7 MG/DL (ref 0.2–1)
BUN SERPL-MCNC: 51 MG/DL (ref 7–18)
CHLORIDE SERPL-SCNC: 99 MMOL/L (ref 98–107)
CO2 SERPL-SCNC: 24 MMOL/L (ref 21–32)
CREAT SERPL-MCNC: 2.6 MG/DL (ref 0.6–1.3)
EOSINOPHIL # BLD AUTO: 0.7 K/UL (ref 0–0.7)
EOSINOPHIL NFR BLD AUTO: 8.3 % (ref 0–7)
GLUCOSE SERPL-MCNC: 188 MG/DL (ref 74–106)
HCT VFR BLD AUTO: 31 % (ref 36.7–47.1)
HGB BLD-MCNC: 10.6 G/DL (ref 12.5–16.3)
LYMPHOCYTES # BLD AUTO: 0.9 K/UL (ref 20–40)
LYMPHOCYTES NFR BLD AUTO: 10.8 % (ref 20.5–51.5)
MAGNESIUM SERPL-MCNC: 2.2 MG/DL (ref 1.8–2.4)
MCH RBC QN AUTO: 28.4 UUG (ref 23.8–33.4)
MCHC RBC AUTO-ENTMCNC: 34 G/DL (ref 32.5–36.3)
MCV RBC AUTO: 83.1 FL (ref 73–96.2)
MONOCYTES # BLD AUTO: 0.9 K/UL (ref 2–10)
MONOCYTES NFR BLD AUTO: 10.5 % (ref 0–11)
NEUTROPHILS # BLD AUTO: 5.9 K/UL (ref 1.8–8.9)
NEUTROPHILS NFR BLD AUTO: 70.1 % (ref 38.5–71.5)
PHOSPHATE SERPL-MCNC: 3.1 MG/DL (ref 2.5–4.9)
PLATELET # BLD AUTO: 173 K/UL (ref 152–348)
POTASSIUM SERPL-SCNC: 4.4 MMOL/L (ref 3.5–5.1)
RBC # BLD AUTO: 3.73 MIL/UL (ref 4.06–5.63)
WBC # BLD AUTO: 8.4 K/UL (ref 3.6–10.2)
WS STN SPEC: 6.4 G/DL (ref 6.4–8.2)

## 2019-06-04 RX ADMIN — Medication SCH EACH: at 06:59

## 2019-06-04 RX ADMIN — FUROSEMIDE SCH MG: 40 TABLET ORAL at 08:44

## 2019-06-04 RX ADMIN — OXYBUTYNIN CHLORIDE SCH MG: 5 TABLET, EXTENDED RELEASE ORAL at 20:50

## 2019-06-04 RX ADMIN — ALPRAZOLAM PRN MG: 0.25 TABLET ORAL at 14:09

## 2019-06-04 RX ADMIN — AMLODIPINE BESYLATE SCH MG: 10 TABLET ORAL at 08:45

## 2019-06-04 RX ADMIN — SODIUM CHLORIDE PRN UNIT: 9 INJECTION, SOLUTION INTRAVENOUS at 16:33

## 2019-06-04 RX ADMIN — Medication SCH MG: at 20:50

## 2019-06-04 RX ADMIN — INSULIN GLARGINE SCH UNITS: 100 INJECTION, SOLUTION SUBCUTANEOUS at 20:58

## 2019-06-04 RX ADMIN — AZTREONAM SCH MLS/HR: 1 INJECTION, POWDER, FOR SOLUTION INTRAMUSCULAR; INTRAVENOUS at 06:05

## 2019-06-04 RX ADMIN — INSULIN GLARGINE SCH UNITS: 100 INJECTION, SOLUTION SUBCUTANEOUS at 08:48

## 2019-06-04 RX ADMIN — ATORVASTATIN CALCIUM SCH MG: 10 TABLET, FILM COATED ORAL at 20:49

## 2019-06-04 RX ADMIN — AZTREONAM SCH MLS/HR: 1 INJECTION, POWDER, FOR SOLUTION INTRAMUSCULAR; INTRAVENOUS at 13:50

## 2019-06-04 RX ADMIN — SODIUM CHLORIDE PRN UNIT: 9 INJECTION, SOLUTION INTRAVENOUS at 12:05

## 2019-06-04 RX ADMIN — CLOPIDOGREL BISULFATE SCH MG: 75 TABLET ORAL at 08:43

## 2019-06-04 RX ADMIN — CLONIDINE HYDROCHLORIDE SCH MG: 0.2 TABLET ORAL at 22:28

## 2019-06-04 RX ADMIN — CARVEDILOL SCH MG: 12.5 TABLET, FILM COATED ORAL at 17:13

## 2019-06-04 RX ADMIN — LINEZOLID SCH MG: 600 TABLET, FILM COATED ORAL at 08:43

## 2019-06-04 RX ADMIN — SODIUM CHLORIDE PRN UNIT: 9 INJECTION, SOLUTION INTRAVENOUS at 20:58

## 2019-06-04 RX ADMIN — Medication SCH EACH: at 20:48

## 2019-06-04 RX ADMIN — LEVOFLOXACIN SCH MG: 250 TABLET, FILM COATED ORAL at 20:49

## 2019-06-04 RX ADMIN — CARVEDILOL SCH MG: 12.5 TABLET, FILM COATED ORAL at 08:45

## 2019-06-04 RX ADMIN — LINEZOLID SCH MG: 600 TABLET, FILM COATED ORAL at 20:50

## 2019-06-04 RX ADMIN — AZTREONAM SCH MLS/HR: 1 INJECTION, POWDER, FOR SOLUTION INTRAMUSCULAR; INTRAVENOUS at 22:17

## 2019-06-04 RX ADMIN — FAMOTIDINE SCH MG: 10 INJECTION INTRAVENOUS at 08:43

## 2019-06-04 RX ADMIN — CLONIDINE HYDROCHLORIDE SCH MG: 0.2 TABLET ORAL at 08:44

## 2019-06-04 RX ADMIN — Medication SCH EACH: at 16:30

## 2019-06-04 RX ADMIN — Medication SCH EACH: at 11:57

## 2019-06-04 NOTE — NUR
PATIENT ALERT ORIENTED, NO SOB NO CHEST PAIN. PATIENT SAT 96% AT 2 LPM. CONT ABX FOR 
BILATERAL LEG CELLULITIS. PATIENT CONTINENT OF BOWEL AND BLADDER, USES URINAL FOR BLADDER 
ELIMINATIONS. RESPIRATION EVEN AND UNLABORED. CALL LIGHT WITHIN REACH.

## 2019-06-04 NOTE — NUR
PATIENT IS SITTING UP ON THE CHAIR AT THIS TIME EATING FOOD BROUGHT IN BY FAMILY INSULIN 
GIVEN PER SLIDING SCALE ENCOURAGED TO KEEP HIS LEGS ELEVATED TO HELP FACILITATE DECREASE OF 
SWELLING AND HE EXPRESSED UNDERSTANDING.

## 2019-06-04 NOTE — NUR
ASSISTED UP ON THE CHAIR ATE BREAKFAST APPETITE WAS GOOD MADE COMFORTABLE AND WILL CONTINUE 
TO OBSERVE.

## 2019-06-04 NOTE — NUR
WOUND CARE CONSULT: PT PRESENTS WITH LOWER LEG REDNESS AND SWELLING, PRESENT ON ADMISSION. 
RECOMMENDATIONS MADE FOR SKIN PROTECTION. DISCUSSED WITH NURSING STAFF. PT IS CONTINENT AT 
THIS TIME WITH CURRENT DYANA SCORE OF 20. WILL SEE PRN. 

-------------------------------------------------------------------------------

Addendum: 06/04/19 at 1351 by JR BOOTH RN

-------------------------------------------------------------------------------

Amended: Links added.

## 2019-06-04 NOTE — NUR
RECEIVED PATIENT IN BED WITH IV ATB STILL INFUSING SECONDARY TO IV SITE WAS INFILTERATED AND 
A NEW SITE WAS INSERTED WITH NO ADVERSE OR ALLERGIC REACTIONS AT THIS TIME.PATIENT IS ALERT 
AND ORIENTED BUT FORGETFUL NEEDED FREQUENT REDIRECTIONS AS PATIENT FORGETS.BLE STILL RED AND 
SWOLLEN ENCOURAGED TO KEEP ELEVATED TO REDUCE SWELLING AND EXPRESSED UNDERSTANDING CALL 
LIGHTS AND PERSONAL BELONGINGS ARE WITHIN EASY REACH MADE COMFORTABLE AND WILL CONTINUE TO 
OBSERVE PATIENT.

## 2019-06-05 VITALS — SYSTOLIC BLOOD PRESSURE: 134 MMHG | DIASTOLIC BLOOD PRESSURE: 60 MMHG

## 2019-06-05 VITALS — DIASTOLIC BLOOD PRESSURE: 58 MMHG | SYSTOLIC BLOOD PRESSURE: 119 MMHG

## 2019-06-05 VITALS — DIASTOLIC BLOOD PRESSURE: 65 MMHG | SYSTOLIC BLOOD PRESSURE: 130 MMHG

## 2019-06-05 VITALS — SYSTOLIC BLOOD PRESSURE: 154 MMHG | DIASTOLIC BLOOD PRESSURE: 70 MMHG

## 2019-06-05 VITALS — SYSTOLIC BLOOD PRESSURE: 133 MMHG | DIASTOLIC BLOOD PRESSURE: 52 MMHG

## 2019-06-05 LAB
ALP SERPL-CCNC: 37 U/L (ref 50–136)
ALT SERPL W/O P-5'-P-CCNC: 42 U/L (ref 16–63)
AST SERPL-CCNC: 34 U/L (ref 15–37)
BASOPHILS # BLD AUTO: 0 K/UL (ref 0–8)
BASOPHILS NFR BLD AUTO: 0.4 % (ref 0–2)
BILIRUB SERPL-MCNC: 0.7 MG/DL (ref 0.2–1)
BUN SERPL-MCNC: 50 MG/DL (ref 7–18)
CHLORIDE SERPL-SCNC: 99 MMOL/L (ref 98–107)
CO2 SERPL-SCNC: 25 MMOL/L (ref 21–32)
CREAT SERPL-MCNC: 2.4 MG/DL (ref 0.6–1.3)
EOSINOPHIL # BLD AUTO: 1.2 K/UL (ref 0–0.7)
EOSINOPHIL NFR BLD AUTO: 13.9 % (ref 0–7)
GLUCOSE SERPL-MCNC: 99 MG/DL (ref 74–106)
HCT VFR BLD AUTO: 31.9 % (ref 36.7–47.1)
HGB BLD-MCNC: 11.1 G/DL (ref 12.5–16.3)
LYMPHOCYTES # BLD AUTO: 1.2 K/UL (ref 20–40)
LYMPHOCYTES NFR BLD AUTO: 14.3 % (ref 20.5–51.5)
MAGNESIUM SERPL-MCNC: 2.3 MG/DL (ref 1.8–2.4)
MCH RBC QN AUTO: 29 UUG (ref 23.8–33.4)
MCHC RBC AUTO-ENTMCNC: 35 G/DL (ref 32.5–36.3)
MCV RBC AUTO: 83 FL (ref 73–96.2)
MONOCYTES # BLD AUTO: 0.9 K/UL (ref 2–10)
MONOCYTES NFR BLD AUTO: 10.5 % (ref 0–11)
NEUTROPHILS # BLD AUTO: 5.1 K/UL (ref 1.8–8.9)
NEUTROPHILS NFR BLD AUTO: 60.9 % (ref 38.5–71.5)
PHOSPHATE SERPL-MCNC: 3.7 MG/DL (ref 2.5–4.9)
PLATELET # BLD AUTO: 179 K/UL (ref 152–348)
POTASSIUM SERPL-SCNC: 4 MMOL/L (ref 3.5–5.1)
RBC # BLD AUTO: 3.84 MIL/UL (ref 4.06–5.63)
WBC # BLD AUTO: 8.4 K/UL (ref 3.6–10.2)
WS STN SPEC: 6.7 G/DL (ref 6.4–8.2)

## 2019-06-05 RX ADMIN — INSULIN GLARGINE SCH UNITS: 100 INJECTION, SOLUTION SUBCUTANEOUS at 21:06

## 2019-06-05 RX ADMIN — CARVEDILOL SCH MG: 12.5 TABLET, FILM COATED ORAL at 17:08

## 2019-06-05 RX ADMIN — LINEZOLID SCH MG: 600 TABLET, FILM COATED ORAL at 20:57

## 2019-06-05 RX ADMIN — Medication SCH EACH: at 20:57

## 2019-06-05 RX ADMIN — OXYBUTYNIN CHLORIDE SCH MG: 5 TABLET, EXTENDED RELEASE ORAL at 20:58

## 2019-06-05 RX ADMIN — CLONIDINE HYDROCHLORIDE SCH MG: 0.2 TABLET ORAL at 08:24

## 2019-06-05 RX ADMIN — SODIUM CHLORIDE PRN UNIT: 9 INJECTION, SOLUTION INTRAVENOUS at 21:08

## 2019-06-05 RX ADMIN — FAMOTIDINE SCH MG: 20 TABLET, FILM COATED ORAL at 08:23

## 2019-06-05 RX ADMIN — Medication SCH EACH: at 21:04

## 2019-06-05 RX ADMIN — LEVOFLOXACIN SCH MG: 250 TABLET, FILM COATED ORAL at 19:57

## 2019-06-05 RX ADMIN — SODIUM CHLORIDE PRN UNIT: 9 INJECTION, SOLUTION INTRAVENOUS at 16:28

## 2019-06-05 RX ADMIN — Medication SCH MG: at 20:57

## 2019-06-05 RX ADMIN — CARVEDILOL SCH MG: 12.5 TABLET, FILM COATED ORAL at 08:23

## 2019-06-05 RX ADMIN — FUROSEMIDE SCH MG: 40 TABLET ORAL at 08:23

## 2019-06-05 RX ADMIN — Medication SCH EACH: at 16:24

## 2019-06-05 RX ADMIN — CLOPIDOGREL BISULFATE SCH MG: 75 TABLET ORAL at 08:24

## 2019-06-05 RX ADMIN — CLONIDINE HYDROCHLORIDE SCH MG: 0.2 TABLET ORAL at 20:58

## 2019-06-05 RX ADMIN — INSULIN GLARGINE SCH UNITS: 100 INJECTION, SOLUTION SUBCUTANEOUS at 08:30

## 2019-06-05 RX ADMIN — AZTREONAM SCH MLS/HR: 1 INJECTION, POWDER, FOR SOLUTION INTRAMUSCULAR; INTRAVENOUS at 13:29

## 2019-06-05 RX ADMIN — AZTREONAM SCH MLS/HR: 1 INJECTION, POWDER, FOR SOLUTION INTRAMUSCULAR; INTRAVENOUS at 05:01

## 2019-06-05 RX ADMIN — LINEZOLID SCH MG: 600 TABLET, FILM COATED ORAL at 08:25

## 2019-06-05 RX ADMIN — SODIUM CHLORIDE PRN UNIT: 9 INJECTION, SOLUTION INTRAVENOUS at 11:18

## 2019-06-05 RX ADMIN — ATORVASTATIN CALCIUM SCH MG: 10 TABLET, FILM COATED ORAL at 20:58

## 2019-06-05 RX ADMIN — AMLODIPINE BESYLATE SCH MG: 10 TABLET ORAL at 08:23

## 2019-06-05 RX ADMIN — AZTREONAM SCH MLS/HR: 1 INJECTION, POWDER, FOR SOLUTION INTRAMUSCULAR; INTRAVENOUS at 21:21

## 2019-06-05 RX ADMIN — Medication SCH EACH: at 05:01

## 2019-06-05 RX ADMIN — Medication SCH EACH: at 10:58

## 2019-06-05 NOTE — NUR
Received pt. in bed, comfortable. A/OX4 verbally responsive and able to make his needs 
known. All due medications given and tolerated well. On PO ABX for ble cellulitis, no ASE 
noted. No s/sx of hypo/hyperglycemia. PIV on LAC remain patent and intact. All pt. needs 
attended and met promptly. Safety measures in place. Call light and all frequently used 
items within pt. reach.

## 2019-06-05 NOTE — NUR
RECEIVED PATIENT AWAKE AND SITTING UP IN CHAIR.  NO COMPLAINTS OF PAIN OR DISCOMFORT AT THIS 
TIME.  ALL SAFETY AND FALL PREVENTION MEASURES IN PLACE.  VS ARE WNL. CALL LIGHT AND 
PERSONAL ITEMS WITHIN REACH AT ALL TIMES.  WILL CONTINUE TO MONITOR.

## 2019-06-05 NOTE — NUR
End of shift note: No significant change during this shift. All needs attended and met 
promptly. Safety measures in placed. Bed in low position, brake on, side rails up x2 as an 
enabler. Call light and all frequently used items within pt. reach. Will endorse to next 
shift accordingly

## 2019-06-06 VITALS — SYSTOLIC BLOOD PRESSURE: 122 MMHG | DIASTOLIC BLOOD PRESSURE: 53 MMHG

## 2019-06-06 VITALS — SYSTOLIC BLOOD PRESSURE: 105 MMHG | DIASTOLIC BLOOD PRESSURE: 39 MMHG

## 2019-06-06 RX ADMIN — AZTREONAM SCH MLS/HR: 1 INJECTION, POWDER, FOR SOLUTION INTRAMUSCULAR; INTRAVENOUS at 14:07

## 2019-06-06 RX ADMIN — INSULIN GLARGINE SCH UNITS: 100 INJECTION, SOLUTION SUBCUTANEOUS at 10:50

## 2019-06-06 RX ADMIN — Medication SCH EACH: at 10:51

## 2019-06-06 RX ADMIN — LINEZOLID SCH MG: 600 TABLET, FILM COATED ORAL at 08:39

## 2019-06-06 RX ADMIN — Medication SCH EACH: at 05:33

## 2019-06-06 RX ADMIN — AZTREONAM SCH MLS/HR: 1 INJECTION, POWDER, FOR SOLUTION INTRAMUSCULAR; INTRAVENOUS at 05:24

## 2019-06-06 RX ADMIN — Medication SCH EACH: at 08:39

## 2019-06-06 RX ADMIN — CLONIDINE HYDROCHLORIDE SCH MG: 0.2 TABLET ORAL at 08:39

## 2019-06-06 RX ADMIN — AMLODIPINE BESYLATE SCH MG: 10 TABLET ORAL at 08:39

## 2019-06-06 RX ADMIN — FAMOTIDINE SCH MG: 20 TABLET, FILM COATED ORAL at 08:39

## 2019-06-06 RX ADMIN — CARVEDILOL SCH MG: 12.5 TABLET, FILM COATED ORAL at 08:40

## 2019-06-06 RX ADMIN — CLOPIDOGREL BISULFATE SCH MG: 75 TABLET ORAL at 08:39

## 2019-06-06 NOTE — NUR
PATIENT SITTING IN CHAIR, AOX4. DENIES PAIN OR SOB AT THIS TIME. LT. AC IV FLUSHED AND 
LEAKING. WILL PLACE A NEW IV AND HL. BLE REDNESS NOTED. SAFETY AND FALL PREVENTION IN PLACE. 
CALL LIGHT IN REACH. BED IN LOW POSITION. WILL CONTINUE TO MONITOR.

## 2019-06-06 NOTE — NUR
ACCUCHECK RESULTS ARE 62. NOTIFIED CHARGE GAVE PATIENT 8 OZ ORANGE JUICE. WILL RECHECK AFTER 
30 MINUTES.

## 2019-06-06 NOTE — NUR
PATIENT SITTING UP IN CHAIR AWAKE AND RESTING COMFORTABLY.  PATIENT EXPRESSED HIS WISH TO BE 
ABLE TO LEAVE BY SATURDAY BECAUSE OF [employer].  I ADVISED PATIENT I WOULD NOTIFY 
AM NURSE REGARDING HIS WISHES.  VS ARE WNL AND PATIENT IS STABLE.  SAFETY AND FALL 
PRECAUTION MEASURES REMAIN IN PLACE. CALL LIGHT AND PERSONAL ITEMS ARE WITHIN REACH AT ALL 
TIMES.

## 2019-06-06 NOTE — NUR
PATIENT DISCHARGED TO HOME. WIFE (PANFILO) WILL DRIVE HIM HOME. PATIENT IN STABLE 
CONDITION. DISCHARGE INSTRUCTIONS AND THE RX GIVEN TO PATIENT. BELONGINGS LIST COMPLETED 
WITH PATIENT AND PATIENT LEFT WITH BELONGINGS VIA WC. VITAL SIGNS STABLE UPON DC. /39, 
HR 63, TEMP 97.6, O2SATS ON RA 95%.

## 2023-06-29 NOTE — NUR
Received pt in bed, AAO x 4 with family member at bedside, watching television. No acute 
distress noted. Verbally responsive and able to make needs known. Denies pain or discomfort 
at this time. Continuing IVF NS @ 75 cc/hr on L FA #20g. No s/s infiltration noted. All 
safety measures and fall precautions maintained. Call light within reach. All personal 
belongings within reach. New order for bladder scan q 8 hrs, straight cath PRN if PVR > 300 
ml. Pt aware and verbalized understanding. Will continue to monitor. Adbry Pregnancy And Lactation Text: It is unknown if this medication will adversely affect pregnancy or breast feeding.